# Patient Record
(demographics unavailable — no encounter records)

---

## 2019-07-16 NOTE — ULT
Renal ultrasound:

7/16/2019



COMPARISON: None



HISTORY: Microscopic hematuria technique: Multiplanar grayscale sonographic imaging of the kidneys an
d urinary bladder obtained.



FINDINGS: Right kidney measures 11.1 x 4.8 x 6.0 cm. Left kidney measures 12.4 x 5.4 x 6.2 cm.



No solid renal mass, hydronephrosis, or renal stone appreciated on either side. There is an exophytic
 cyst emanating from the upper pole of the right kidney with a thin internal septation. This cyst

measures approximately 5.9 x 6.5 x 5.9 cm. There is a cyst within the lateral aspect of the left kidn
ey measuring 2.4 x 2.4 x 2.6 cm.



Prevoid urinary bladder volume is 135 cc and postvoid urinary bladder volume is 41 cc.



IMPRESSION: Bilateral renal cysts.



Reported By: Reji Lundberg 

Electronically Signed:  7/16/2019 3:25 PM

## 2019-08-16 NOTE — CT
CT ABDOMEN AND PELVIS WITH AND WITHOUT IV CONTRAST

 8/16/2019



CLINICAL INFORMATION:

Follow-up kidneys cysts. Family history of renal cancer.



COMPARISON:

 10/12/2017



Technique:

Multiple contiguous axial CT images are obtained through the abdomen and pelvis with IV contrast. Cor
onal reformatted images are provided.



FINDINGS:



Lower Chest: within normal limits.

Vessels: Mild vascular calcifications in the abdominal aorta and iliac arteries. 



Abdomen:

Portal vein:Patent

Gallbladder: Within normal limits for CT imaging.

Liver: within normal limits.

Spleen: within normal limits.

Pancreas: within normal limits.

Adrenals: within normal limits.

Kidneys: Again noted is a large exophytic right renal cyst involving the midportion right kidney manasa
uring 6.5 cm. An exophytic 2.6 cm cyst is again seen involving the midportion left kidney. A tiny

subcentimeter too small to characterize hypodense lesion is seen in each kidney. No enhancing renal m
ass is present. No renal calculi are seen bilaterally, and there is no evidence of hydronephrosis.

No ureteral calculi are seen. No filling defects are seen on the delayed images within either renal c
ollecting system or in the right ureter. The left ureter is not opacified.



Bowel: Normal caliber.

Appendix: The appendix is visualized and normal in caliber.





Peritoneum: No ascites or free air; no fluid collection.

Mesentery and Retroperitoneum: No enlarged mesenteric or retroperitoneal lymph nodes.

Abdominal Wall: Tiny fat-containing umbilical hernia.



Pelvis:

Reproductive Organs: No pelvic masses.

Pelvis within normal limits.

Bladder: Partially distended and normal in appearance.



Bones: A few sclerotic densities are seen in the proximal right femur demonstrating characteristics m
ost compatible with bone islands. No suspicious lytic or sclerotic osseous lesions are identified. 





IMPRESSION:



1. No acute findings are seen in the abdomen or pelvis.

2. Bilateral renal cysts as well as a subcentimeter too small to characterize hypodense lesion in eac
h kidney.

3. No renal or ureteral calculi are seen bilaterally, and there is no hydronephrosis.



Reported By: Preston Quick 

Electronically Signed:  8/16/2019 8:39 AM

## 2020-10-22 NOTE — ULT
US Renal Bilateral STANDARD: 10/22/2020 1:56 PM



CLINICAL HISTORY: Complex renal cyst.



STUDY: Renal ultrasound



COMPARISON: 7/16/2019 and CT abdomen/pelvis 8/16/2019                  



FINDINGS:



Right kidney: 

Echogenicity: Normal. 

Masses/cysts:  7.9 cm simple cyst  

Hydronephrosis: None. 

Calcifications: None.  

Length: 11.7 cm



Left kidney: 

Echogenicity: Normal. 

Masses/cysts:  3.6 cm simple cyst  

Hydronephrosis: None. 

Calcifications: None.  

Length: 12.4 cm



Limited visualization of the urinary bladder is unremarkable.



IMPRESSION:

Bilateral renal cysts



Reported By: Deepak Brandon 

Electronically Signed:  10/22/2020 2:39 PM